# Patient Record
Sex: MALE | Race: WHITE | NOT HISPANIC OR LATINO | URBAN - METROPOLITAN AREA
[De-identification: names, ages, dates, MRNs, and addresses within clinical notes are randomized per-mention and may not be internally consistent; named-entity substitution may affect disease eponyms.]

---

## 2021-03-10 ENCOUNTER — OFFICE VISIT (OUTPATIENT)
Dept: URBAN - METROPOLITAN AREA CLINIC 98 | Facility: CLINIC | Age: 66
End: 2021-03-10
Payer: MEDICARE

## 2021-03-10 DIAGNOSIS — K51.80 CHRONIC PANCOLONIC ULCERATIVE COLITIS: ICD-10-CM

## 2021-03-10 DIAGNOSIS — K51.90 ULCERATIVE COLITIS: ICD-10-CM

## 2021-03-10 PROCEDURE — 99215 OFFICE O/P EST HI 40 MIN: CPT | Performed by: INTERNAL MEDICINE

## 2021-03-10 RX ORDER — ROSUVASTATIN CALCIUM 10 MG
TAKE 1 TABLET (10 MG) BY ORAL ROUTE ONCE DAILY TABLET ORAL 1
Qty: 0 | Refills: 0 | Status: ACTIVE | COMMUNITY
Start: 1900-01-01

## 2021-03-10 RX ORDER — DIAZEPAM 5 MG
TABLET ORAL
Qty: 0 | Refills: 0 | Status: ACTIVE | COMMUNITY
Start: 1900-01-01

## 2021-03-10 RX ORDER — FLUTICASONE PROPIONATE 50 UG/1
SPRAY, METERED NASAL
Qty: 0 | Refills: 0 | Status: ACTIVE | COMMUNITY
Start: 1900-01-01

## 2021-03-10 RX ORDER — GLIMEPIRIDE 2 MG/1
TAKE 1 TABLET (2 MG) BY ORAL ROUTE ONCE DAILY TABLET ORAL 1
Qty: 0 | Refills: 0 | Status: ACTIVE | COMMUNITY
Start: 1900-01-01

## 2021-03-10 RX ORDER — CARVEDILOL 6.25 MG/1
TABLET, FILM COATED ORAL
Qty: 0 | Refills: 0 | Status: ACTIVE | COMMUNITY
Start: 1900-01-01

## 2021-03-10 RX ORDER — VALSARTAN 320 MG/1
TAKE 1 TABLET (320 MG) BY ORAL ROUTE ONCE DAILY TABLET ORAL 1
Qty: 0 | Refills: 0 | Status: ACTIVE | COMMUNITY
Start: 1900-01-01

## 2021-03-10 RX ORDER — ALBUTEROL SULFATE 90 UG/1
AEROSOL, METERED RESPIRATORY (INHALATION)
Qty: 0 | Refills: 0 | Status: ACTIVE | COMMUNITY
Start: 1900-01-01

## 2021-03-10 RX ORDER — ZOLPIDEM TARTRATE 10 MG/1
TAKE 1 TABLET (10 MG) BY ORAL ROUTE ONCE DAILY AT BEDTIME TABLET, FILM COATED ORAL 1
Qty: 0 | Refills: 0 | Status: ACTIVE | COMMUNITY
Start: 1900-01-01

## 2021-03-10 RX ORDER — TERAZOSIN 2 MG/1
CAPSULE ORAL
Qty: 0 | Refills: 0 | Status: ACTIVE | COMMUNITY
Start: 1900-01-01

## 2021-03-10 RX ORDER — PHENAZOPYRIDINE HYDROCHLORIDE 100 MG/1
TAKE 1 CAPSULE (60 MG) BY ORAL ROUTE ONCE DAILY TABLET, COATED ORAL 1
Qty: 0 | Refills: 0 | Status: ACTIVE | COMMUNITY
Start: 1900-01-01

## 2021-03-10 RX ORDER — SACUBITRIL AND VALSARTAN 49; 51 MG/1; MG/1
TABLET, FILM COATED ORAL
Qty: 0 | Refills: 0 | Status: ACTIVE | COMMUNITY
Start: 1900-01-01

## 2021-03-10 RX ORDER — RIVAROXABAN 20 MG/1
TAKE 1 TABLET (20 MG) BY ORAL ROUTE ONCE DAILY WITH THE EVENING MEAL TABLET, FILM COATED ORAL 1
Qty: 0 | Refills: 0 | Status: ACTIVE | COMMUNITY
Start: 1900-01-01

## 2021-03-10 RX ORDER — ASPIRIN 325 MG/1
TABLET ORAL
Qty: 0 | Refills: 0 | Status: ACTIVE | COMMUNITY
Start: 1900-01-01

## 2021-03-10 RX ORDER — INSULIN GLARGINE 100 [IU]/ML
INJECTION, SOLUTION SUBCUTANEOUS
Qty: 0 | Refills: 0 | Status: ACTIVE | COMMUNITY
Start: 1900-01-01

## 2021-03-10 RX ORDER — DICLOFENAC EPOLAMINE 0.01 G/1
APPLY 1 PATCH (180 MG) TO MOST PAINFUL AREA BY TRANSDERMAL ROUTE 2 TIMES PER DAY SYSTEM TOPICAL 2
Qty: 0 | Refills: 0 | Status: ACTIVE | COMMUNITY
Start: 1900-01-01

## 2021-03-10 RX ORDER — ALPRAZOLAM 0.5 MG/1
TABLET ORAL
Qty: 0 | Refills: 0 | Status: ACTIVE | COMMUNITY
Start: 1900-01-01

## 2021-03-10 NOTE — HPI-OTHER HISTORIES
64 yo male with UC last on Humira in 2016 and it was stopped due to suspcion of Heat failures.    EF was 24%.  2016 or so got pacemaker and defibillator.  Everything aok now.  Still sees Dr. Marx Diabetes is better.  UC is good. No pain diarrhea or bleeding.  Dx with UC 12 years ago and was severe.  No recent colonoscopy. UC syx all good.  Was so sick until he started Humira ---- it saved his life.  Mesalamine made him sick --- allergic to mesalamine.  Still on laudry list of meds. Depression and pain. Neck and low back Quit drinking. BP is good now.  Discussed need for colonoscopy to check for colon cancer and he is at increased risk of colon cancer due to age and UC and he understands that.  He wants one eventually but not in the next months.  Does not want labs.  Will try to get labs and records and meds.  Retired and sufficient resources. Reverse mortgage until he is 72. Payments of $3,000 end at age 72. He was taking 5000 a month from his [employer], won't be taxable.  Will reconcile meds etc.  ===================== 2018  Follow-up UC    History Of Present Illness  This is a scheduled appointment for this patient, a 63 year old /White male, after a previous visit on 07/02/2018, for a follow-up evaluation of Ulcerative colitis.     64 yo male comes in for UC follow up.  Was last on Humira 2 years ago.  At May 11 2016 ov, after discussion with Dr. Marx, decision made to d/c Humira due to unexplained low EF of 24%.  Off Humira for 2 years, his EF is now 55%.  Salbador has wanted to resume Humira but we have now done a re-evaluation and he has mild not moderate or severe syx.  Choice, if needed might be TOFa.  H/o shingles vaccine and pneumonia vaccine.          Past Medical History  Disease Name Date Onset Notes  Colon polyps [facility] 05/11/2016 - 01/27/2016 -   Diabetes k 05/11/2016 - 01/27/2016 -   Hypertension k 05/11/2016 - 01/27/2016 -   Influenza Immunization [facility] 05/11/2016 - 01/27/2016 -   Rectal Bleeding 5/2018 05/17/2018 -   Ulcerative colitis unk 05/11/2016 - 05/11/2016 - 01/27/2016 -       Past Surgical History  Procedure Name Date Notes  Appendectomy unk 05/11/2016 - 01/27/2016 -   Colonoscopy 6/2018 07/02/2018 - 05/17/2018 -   Fracture Surgery unk-Triple fusion 05/11/2016 - 01/27/2016 -   Spinal Surgery unk 05/11/2016 - 01/27/2016 -       Medication List  Name Date Started Instructions  albuterol sulfate inhalation  --   alprazolam 0.5 mg oral tablet  --   Amaryl 2 mg oral tablet  take 1 tablet (2 mg) by oral route once daily  Ambien 10 mg oral tablet  take 1 tablet (10 mg) by oral route once daily at bedtime  amidirone 200 mg tab  --   Owen Aspirin 325 mg oral tablet  --   carvedilol 6.25 mg oral tablet  --   Crestor 10 mg oral tablet  take 1 tablet (10 mg) by oral route once daily  Cymbalta 60 mg oral capsule,delayed release(DR/EC)  take 1 capsule (60 mg) by oral route once daily  Diovan 320 mg oral tablet  take 1 tablet (320 mg) by oral route once daily  Entresto 49-51 mg oral tablet  --   Flector 1.3 % transdermal patch 12 hour  apply 1 patch (180 mg) to most painful area by transdermal route 2 times per day  Flonase Allergy Relief 50 mcg/actuation nasal spray,suspension  --   fluticasone 50 mcg/actuation nasal spray,suspension  --   Humira Pen 40 mg/0.8 mL subcutaneous pen injector kit 06/21/2018 inject 0.8 milliliter by subcutaneous route every 2 weeks  Humira Pen Crohn's-[facility] Start 40 mg/0.8 mL subcutaneous pen injector kit 06/21/2018 4 pens/160mg on Day 1 -- 2 pens/80 mg on Day 15  Januvia oral  --   Lantus 100 unit/mL subcutaneous solution  --   Lexapro 10 mg oral tablet  take 1 tablet (10 mg) by oral route once daily  magnesium 200 mg oral tablet  --   meloxicam oral  --   metoprolol tartrate oral  --   oxycodone oral  --   ProAir HFA 90 mcg/actuation inhalation HFA aerosol inhaler  --   Symbicort inhalation  --   terazosin 2 mg oral capsule  --   Valium 5 mg oral tablet  --   Wellbutrin 100 mg oral tablet  300 qd  Xarelto 20 mg oral tablet  take 1 tablet (20 mg) by oral route once daily with the evening meal      Allergy List  Allergen Name Date Reaction Notes  No Known Environmental Allergies --  --  --   No Known Food Allergies --  --  --   other --  --  05/11/2016 - MESALAMINE      Family Medical History  Disease Name Relative/Age Notes  Family history of heart disease/coronary artery disease Father/ Mother/ Sister/  5/11/2016      Social History  Finding Status Start/Stop Quantity Notes  Alcohol Former --/-- --  07/02/2018 - 05/17/2018 - 11/13/2017 - 05/11/2016 - 01/27/2016 -   Denies illicit substance abuse Never --/-- --  07/02/2018 -   Denies substance abuse Never --/-- --  07/02/2018 -   Tobacco Former --/-- cigars 07/02/2018 - 05/17/2018 - 11/13/2017 - 05/11/2016 - 01/27/2016 -       Review of Systems  ConstitutionalDenies : body aches, chills, fatigue, fever, loss of appetite, malaise, night sweats, weight gain, weight loss  EyesDenies : blurred vision, visual changes  HENTDenies : Ear Pain/Ringing, hearing loss, Mouth Ulcers/Sores, nose bleeds, problems with gums/teeth, trouble swallowing  CardiovascularDenies : chest pain, leaky heart valves, heart murmur, heart racing/skipping, high blood pressure, palpitations  RespiratoryDenies : chronic cough, shortness of breath, wheezing or asthma symptoms  GastrointestinalDenies : Abdominal Pain/discomfort, Anal/Rectal Pain or Itching, Anal Spasm, Black Stool, Bloating/belching/gaseouness, Change of Bowel Habit, Constipation, Diarrhea/Loose Stool, Difficulty in Swallowing, Heartburn/esophageal reflux, Hemorrhoids, Indigestion, Mucus in Stool, Nausea/vomiting, Rectal Bleeding, Unintentional Weight Loss  GenitourinaryDenies : Blood in Urine, Burning/pain with Urination, frequency, Recent/frequent UTI, Kidney Stones  IntegumentDenies : itching/dry skin, jaundice, rashes, bumps or sores  NeurologicDenies : headaches, dizziness/vertigo, head trauma/injury, recent numbness/weakness, seizures  MusculoskeletalDenies : back pain, decreased range of motion, joint pain/arthritis, Problems Walking/calf or leg pain  EndocrineDenies : bruise easily, excessive thirst, heat/cold intolerance, history of high or low blood sugar  PsychiatricDenies : anxiety, changes in sleep pattern, depression, loss of memory  Heme-LymphDenies : Bleeding Problems, Enlarged Nodes/Swolen Glands, excessive bruising, history of anemia  Allergic-ImmunologicDenies : seasonal allergies    Vitals  Date Time BP Position Site L\R Cuff Size HR RR TEMP (F) WT  HT  BMI kg/m2 BSA m2 O2 Sat HC     07/02/2018 11:45 /66 Sitting    80 - R  98.3 218lbs 2oz 6'  2" 28.01 2.27        Physical Examination  ConstitutionalAppearance : Well nourished, well developed patient in no distress. Ambulating without difficulty.  Ability to Communicate : Normal communication ability  EyesConjunctivae and Eyelids : Conjunctivae and eyelids appear normal  Sclerae : White without injection  HENTHead :  Inspection : Normocephalic and atraumatic  Face :  Inspection : Face within normal limits  Ears :  External Ears : External ears within normal limits  Nose/Nasopharynx :  External Nose : External nose normal appearance, nares patent, no nasal discharge  Mouth and Throat :  General : Oral cavity appearance normal, breath odor normal  Lips : Appearance normal  NeckInspection and Palpation : Normal appearance without tenderness on palpation or deformities, trachea midline  Thyroid : Normal size without tenderness, nodules or masses  Jugular Veins : no JVD  ChestInspection of Chest : No lesions, deformities or traumatic injuries present. No significant scars are present.  Respiratory Effort : Breathing is unlabored without accessory muscle use  Palpation of Chest : Chest wall non-tender with no masses present. Tactile fremitus is normal  Auscultation : Normal breath sounds  CardiovascularHeart :  Auscultation : Heart rate is regular with normal rhythm. No murmurs are heard. No edema.  GastrointestinalAbdominal Exam : Abdomen soft without rigidity or guarding, abdomen non-tender to palpation, normal bowel sounds, no masses present, non-distended  Liver and Spleen : No hepatomegaly present. Liver is non-tender to palpation and spleen is not palpable.  LymphaticNeck : No lymphadenopathy present  Axillae : No lymphadenopathy present  Groin : No lymphadenopathy present  MusculoskeletalGait and Station : Normal Gait, normal station  Neck/Spine/Pelvis : No tenderness of deformities present.  SkinGeneral Inspection : No rashes, lesions or areas of discoloration present. Skin turgor is normal.  General Palpation : No abnormalities, masses or tenderness on palpation.  Neurologic and PsychiatricOrientation : Oriented to person, place and time  Sensation : Normal sensation  Memory : Short and long term memory intact.  Mood and Affect : Normal mood with an appropriate affect      Assessment  Ulcerative colitis     556.6  Ulcerative colitis     556.9/K51.90    Plan  OrdersPatient not identified as an unhealthy alcohol user when screene () - - 07/02/2018  Influenza immunization administered or previously received () - - 07/02/2018  BMI screening above normal () - - 07/02/2018  Current tobacco non-user (CAD, cap, COPD, PV) (dm) (IBD) (1036F, ) - - 07/02/2018  Colorectal cancer screening results documented and reviewed (PV) (3017F) - - 07/02/2018  Documentation of current medications. () - - 07/02/2018  CMP (comprehensive metabolic panel) (08003) - - 07/02/2018  Sed rate (02611) - - 07/02/2018  C-reactive protein (59816) - - 07/02/2018  Ferritin assay (80528) - - 07/02/2018  Iron + iron binding capacity panel ser/plas (53182, 78191) - - 07/02/2018  Vitamin B12 and folate measurement (72589, 63084) - - 07/02/2018  CBC with diff (21607) - - 07/02/2018  25-OH-Vitamin D (93824) - - 07/02/2018  InstructionsDISCUSSION:  I have documented a list of current medications and reviewed it with the patient.  I encouraged the patient to diet and exercise.  DispositionReturn To Clinic in 6 Months    For consideration by Dr. Marx-- would use Tofa and only use Humira if needed.  Discuss with Dr. Devries re: Tubular adenoma in descending colon polyp bx.  Discussed with Salbador.  F/U colon 6-12 months with Dr. Devries.  cc:  Brian Devries MD cc:  Ashok San MD         Electronically Signed by: Vladimir Fabian MD -Author on July 2, 2018 12:21:26 PM

## 2021-03-23 ENCOUNTER — TELEPHONE ENCOUNTER (OUTPATIENT)
Dept: URBAN - METROPOLITAN AREA CLINIC 98 | Facility: CLINIC | Age: 66
End: 2021-03-23

## 2021-03-23 RX ORDER — ONDANSETRON HYDROCHLORIDE 8 MG/1
1 CAPSULE TABLET, FILM COATED ORAL TWICE DAILY
Qty: 180 CAPSULES | Refills: 0 | OUTPATIENT
Start: 2021-03-23 | End: 2021-06-21

## 2021-03-23 RX ORDER — METHYLPREDNISOLONE 4 MG/1
AS DIRECTED TABLET ORAL ONCE DAILY
Qty: 21 TABLETS | Refills: 1 | OUTPATIENT
Start: 2021-03-23 | End: 2021-04-04

## 2021-05-13 ENCOUNTER — ERX REFILL RESPONSE (OUTPATIENT)
Dept: URBAN - METROPOLITAN AREA CLINIC 98 | Facility: CLINIC | Age: 66
End: 2021-05-13

## 2021-05-13 RX ORDER — ONDANSETRON 4 MG/1
TAKE 1 TAB BY MOUTH TWICE A DAY FOR 90 DAYS TABLET, ORALLY DISINTEGRATING ORAL
Qty: 180 | Refills: 0

## 2021-10-18 ENCOUNTER — TELEPHONE ENCOUNTER (OUTPATIENT)
Dept: URBAN - METROPOLITAN AREA CLINIC 98 | Facility: CLINIC | Age: 66
End: 2021-10-18

## 2021-10-18 RX ORDER — METHYLPREDNISOLONE 4 MG/1
AS DIRECTED TABLET ORAL ONCE A DAY
Qty: 21 TABLETS | Refills: 1 | OUTPATIENT
Start: 2021-10-18 | End: 2021-10-30

## 2021-10-21 ENCOUNTER — TELEPHONE ENCOUNTER (OUTPATIENT)
Dept: URBAN - METROPOLITAN AREA CLINIC 98 | Facility: CLINIC | Age: 66
End: 2021-10-21

## 2021-10-21 ENCOUNTER — OFFICE VISIT (OUTPATIENT)
Dept: URBAN - METROPOLITAN AREA CLINIC 98 | Facility: CLINIC | Age: 66
End: 2021-10-21
Payer: MEDICARE

## 2021-10-21 DIAGNOSIS — R10.84 GENERALIZED ABDOMINAL PAIN: ICD-10-CM

## 2021-10-21 DIAGNOSIS — K51.90 ULCERATIVE COLITIS: ICD-10-CM

## 2021-10-21 DIAGNOSIS — R63.4 WEIGHT LOSS: ICD-10-CM

## 2021-10-21 PROCEDURE — 74177 CT ABD & PELVIS W/CONTRAST: CPT | Performed by: INTERNAL MEDICINE

## 2021-10-21 PROCEDURE — 99215 OFFICE O/P EST HI 40 MIN: CPT | Performed by: INTERNAL MEDICINE

## 2021-10-21 RX ORDER — METHYLPREDNISOLONE 4 MG/1
AS DIRECTED TABLET ORAL ONCE A DAY
Qty: 21 TABLETS | Refills: 1 | Status: ACTIVE | COMMUNITY
Start: 2021-10-18 | End: 2021-10-30

## 2021-10-21 RX ORDER — ONDANSETRON 4 MG/1
TAKE 1 TAB BY MOUTH TWICE A DAY FOR 90 DAYS TABLET, ORALLY DISINTEGRATING ORAL
Qty: 180 | Refills: 0 | Status: ACTIVE | COMMUNITY

## 2021-10-21 RX ORDER — FLUTICASONE PROPIONATE 50 UG/1
SPRAY, METERED NASAL
Qty: 0 | Refills: 0 | Status: ACTIVE | COMMUNITY
Start: 1900-01-01

## 2021-10-21 RX ORDER — SACUBITRIL AND VALSARTAN 49; 51 MG/1; MG/1
TABLET, FILM COATED ORAL
Qty: 0 | Refills: 0 | Status: ACTIVE | COMMUNITY
Start: 1900-01-01

## 2021-10-21 RX ORDER — VALSARTAN 320 MG/1
TAKE 1 TABLET (320 MG) BY ORAL ROUTE ONCE DAILY TABLET ORAL 1
Qty: 0 | Refills: 0 | Status: ACTIVE | COMMUNITY
Start: 1900-01-01

## 2021-10-21 RX ORDER — ALBUTEROL SULFATE 90 UG/1
AEROSOL, METERED RESPIRATORY (INHALATION)
Qty: 0 | Refills: 0 | Status: ACTIVE | COMMUNITY
Start: 1900-01-01

## 2021-10-21 RX ORDER — DIAZEPAM 5 MG
TABLET ORAL
Qty: 0 | Refills: 0 | Status: ACTIVE | COMMUNITY
Start: 1900-01-01

## 2021-10-21 RX ORDER — INSULIN GLARGINE 100 [IU]/ML
INJECTION, SOLUTION SUBCUTANEOUS
Qty: 0 | Refills: 0 | Status: ACTIVE | COMMUNITY
Start: 1900-01-01

## 2021-10-21 RX ORDER — RIVAROXABAN 20 MG/1
TAKE 1 TABLET (20 MG) BY ORAL ROUTE ONCE DAILY WITH THE EVENING MEAL TABLET, FILM COATED ORAL 1
Qty: 0 | Refills: 0 | Status: ACTIVE | COMMUNITY
Start: 1900-01-01

## 2021-10-21 RX ORDER — ZOLPIDEM TARTRATE 10 MG/1
TAKE 1 TABLET (10 MG) BY ORAL ROUTE ONCE DAILY AT BEDTIME TABLET, FILM COATED ORAL 1
Qty: 0 | Refills: 0 | Status: ACTIVE | COMMUNITY
Start: 1900-01-01

## 2021-10-21 RX ORDER — ALPRAZOLAM 0.5 MG/1
TABLET ORAL
Qty: 0 | Refills: 0 | Status: ACTIVE | COMMUNITY
Start: 1900-01-01

## 2021-10-21 RX ORDER — GLIMEPIRIDE 2 MG/1
TAKE 1 TABLET (2 MG) BY ORAL ROUTE ONCE DAILY TABLET ORAL 1
Qty: 0 | Refills: 0 | Status: ACTIVE | COMMUNITY
Start: 1900-01-01

## 2021-10-21 RX ORDER — CARVEDILOL 6.25 MG/1
TABLET, FILM COATED ORAL
Qty: 0 | Refills: 0 | Status: ACTIVE | COMMUNITY
Start: 1900-01-01

## 2021-10-21 RX ORDER — PHENAZOPYRIDINE HYDROCHLORIDE 100 MG/1
TAKE 1 CAPSULE (60 MG) BY ORAL ROUTE ONCE DAILY TABLET, COATED ORAL 1
Qty: 0 | Refills: 0 | Status: ACTIVE | COMMUNITY
Start: 1900-01-01

## 2021-10-21 RX ORDER — DICLOFENAC EPOLAMINE 0.01 G/1
APPLY 1 PATCH (180 MG) TO MOST PAINFUL AREA BY TRANSDERMAL ROUTE 2 TIMES PER DAY SYSTEM TOPICAL 2
Qty: 0 | Refills: 0 | Status: ACTIVE | COMMUNITY
Start: 1900-01-01

## 2021-10-21 RX ORDER — HYOSCYAMINE SULFATE 0.12 MG/1
1 TABLET UNDER THE TONGUE AND ALLOW TO DISSOLVE  AS NEEDED TABLET, ORALLY DISINTEGRATING ORAL
Qty: 120 TABLETS | Refills: 5 | OUTPATIENT
Start: 2021-10-21 | End: 2022-04-19

## 2021-10-21 RX ORDER — ROSUVASTATIN CALCIUM 10 MG
TAKE 1 TABLET (10 MG) BY ORAL ROUTE ONCE DAILY TABLET ORAL 1
Qty: 0 | Refills: 0 | Status: ACTIVE | COMMUNITY
Start: 1900-01-01

## 2021-10-21 RX ORDER — TERAZOSIN 2 MG/1
CAPSULE ORAL
Qty: 0 | Refills: 0 | Status: ACTIVE | COMMUNITY
Start: 1900-01-01

## 2021-10-21 RX ORDER — ASPIRIN 325 MG/1
TABLET ORAL
Qty: 0 | Refills: 0 | Status: ACTIVE | COMMUNITY
Start: 1900-01-01

## 2021-10-21 NOTE — HPI-TODAY'S VISIT:
67 yo male with UC dx 2007.    8 month f/u.  Last on Humira 2016.  Allergic to mesalamine.  Severe UC at dx.  Severe CAD. Pacemaker and debrillator.  Dr. Sung cardiologist.  Has been having cramps the past few weeks.  Hurt some this morning.  Has run out of levsin .125 q 4 prn.  It gives him relief.  Pain is below umbilicus.  May last up to a few hours.  Eating less and lost 20 lbs.  No antibiotics over past 6 months.  On day 3 of medrol dose pack.  no fever.  1 stool a day.  Little diarrhea.  No blood in stool today   =========================== 3/10/21  64 yo male with UC last on Humira in 2016 and it was stopped due to suspcion of Heat failures.    EF was 24%.  2016 or so got pacemaker and defibillator.  Everything aok now.  Still sees Dr. Marx Diabetes is better.  UC is good. No pain diarrhea or bleeding.  Dx with UC 12 years ago and was severe.  No recent colonoscopy. UC syx all good.  Was so sick until he started Humira ---- it saved his life.  Mesalamine made him sick --- allergic to mesalamine.  Still on laudry list of meds. Depression and pain. Neck and low back Quit drinking. BP is good now.  Discussed need for colonoscopy to check for colon cancer and he is at increased risk of colon cancer due to age and UC and he understands that.  He wants one eventually but not in the next months.  Does not want labs.  Will try to get labs and records and meds.  Retired and sufficient resources. Reverse mortgage until he is 72. Payments of $3,000 end at age 72. He was taking 5000 a month from his [employer], won't be taxable.  Will reconcile meds etc.  ===================== 2018  Follow-up UC    History Of Present Illness  This is a scheduled appointment for this patient, a 63 year old /White male, after a previous visit on 07/02/2018, for a follow-up evaluation of Ulcerative colitis.     62 yo male comes in for UC follow up.  Was last on Humira 2 years ago.  At May 11 2016 ov, after discussion with Dr. Marx, decision made to d/c Humira due to unexplained low EF of 24%.  Off Humira for 2 years, his EF is now 55%.  Salbador has wanted to resume Humira but we have now done a re-evaluation and he has mild not moderate or severe syx.  Choice, if needed might be TOFa.  H/o shingles vaccine and pneumonia vaccine.          Past Medical History  Disease Name Date Onset Notes  Colon polyps [facility] 05/11/2016 - 01/27/2016 -   Diabetes [facility] 05/11/2016 - 01/27/2016 -   Hypertension [facility] 05/11/2016 - 01/27/2016 -   Influenza Immunization [facility] 05/11/2016 - 01/27/2016 -   Rectal Bleeding 5/2018 05/17/2018 -   Ulcerative colitis [facility] 05/11/2016 - 05/11/2016 - 01/27/2016 -       Past Surgical History  Procedure Name Date Notes  Appendectomy [facility] 05/11/2016 - 01/27/2016 -   Colonoscopy 6/2018 07/02/2018 - 05/17/2018 -   Fracture Surgery [facility]-Triple fusion 05/11/2016 - 01/27/2016 -   Spinal Surgery [facility] 05/11/2016 - 01/27/2016 -       Medication List  Name Date Started Instructions  albuterol sulfate inhalation  --   alprazolam 0.5 mg oral tablet  --   Amaryl 2 mg oral tablet  take 1 tablet (2 mg) by oral route once daily  Ambien 10 mg oral tablet  take 1 tablet (10 mg) by oral route once daily at bedtime  amidirone 200 mg tab  --   Owen Aspirin 325 mg oral tablet  --   carvedilol 6.25 mg oral tablet  --   Crestor 10 mg oral tablet  take 1 tablet (10 mg) by oral route once daily  Cymbalta 60 mg oral capsule,delayed release(DR/EC)  take 1 capsule (60 mg) by oral route once daily  Diovan 320 mg oral tablet  take 1 tablet (320 mg) by oral route once daily  Entresto 49-51 mg oral tablet  --   Flector 1.3 % transdermal patch 12 hour  apply 1 patch (180 mg) to most painful area by transdermal route 2 times per day  Flonase Allergy Relief 50 mcg/actuation nasal spray,suspension  --   fluticasone 50 mcg/actuation nasal spray,suspension  --   Humira Pen 40 mg/0.8 mL subcutaneous pen injector kit 06/21/2018 inject 0.8 milliliter by subcutaneous route every 2 weeks  Humira Pen Crohn's-[facility] Start 40 mg/0.8 mL subcutaneous pen injector kit 06/21/2018 4 pens/160mg on Day 1 -- 2 pens/80 mg on Day 15  Januvia oral  --   Lantus 100 unit/mL subcutaneous solution  --   Lexapro 10 mg oral tablet  take 1 tablet (10 mg) by oral route once daily  magnesium 200 mg oral tablet  --   meloxicam oral  --   metoprolol tartrate oral  --   oxycodone oral  --   ProAir HFA 90 mcg/actuation inhalation HFA aerosol inhaler  --   Symbicort inhalation  --   terazosin 2 mg oral capsule  --   Valium 5 mg oral tablet  --   Wellbutrin 100 mg oral tablet  300 qd  Xarelto 20 mg oral tablet  take 1 tablet (20 mg) by oral route once daily with the evening meal      Allergy List  Allergen Name Date Reaction Notes  No Known Environmental Allergies --  --  --   No Known Food Allergies --  --  --   other --  --  05/11/2016 - MESALAMINE      Family Medical History  Disease Name Relative/Age Notes  Family history of heart disease/coronary artery disease Father/ Mother/ Sister/  5/11/2016      Social History  Finding Status Start/Stop Quantity Notes  Alcohol Former --/-- --  07/02/2018 - 05/17/2018 - 11/13/2017 - 05/11/2016 - 01/27/2016 -   Denies illicit substance abuse Never --/-- --  07/02/2018 -   Denies substance abuse Never --/-- --  07/02/2018 -   Tobacco Former --/-- cigars 07/02/2018 - 05/17/2018 - 11/13/2017 - 05/11/2016 - 01/27/2016 -       Review of Systems  ConstitutionalDenies : body aches, chills, fatigue, fever, loss of appetite, malaise, night sweats, weight gain, weight loss  EyesDenies : blurred vision, visual changes  HENTDenies : Ear Pain/Ringing, hearing loss, Mouth Ulcers/Sores, nose bleeds, problems with gums/teeth, trouble swallowing  CardiovascularDenies : chest pain, leaky heart valves, heart murmur, heart racing/skipping, high blood pressure, palpitations  RespiratoryDenies : chronic cough, shortness of breath, wheezing or asthma symptoms  GastrointestinalDenies : Abdominal Pain/discomfort, Anal/Rectal Pain or Itching, Anal Spasm, Black Stool, Bloating/belching/gaseouness, Change of Bowel Habit, Constipation, Diarrhea/Loose Stool, Difficulty in Swallowing, Heartburn/esophageal reflux, Hemorrhoids, Indigestion, Mucus in Stool, Nausea/vomiting, Rectal Bleeding, Unintentional Weight Loss  GenitourinaryDenies : Blood in Urine, Burning/pain with Urination, frequency, Recent/frequent UTI, Kidney Stones  IntegumentDenies : itching/dry skin, jaundice, rashes, bumps or sores  NeurologicDenies : headaches, dizziness/vertigo, head trauma/injury, recent numbness/weakness, seizures  MusculoskeletalDenies : back pain, decreased range of motion, joint pain/arthritis, Problems Walking/calf or leg pain  EndocrineDenies : bruise easily, excessive thirst, heat/cold intolerance, history of high or low blood sugar  PsychiatricDenies : anxiety, changes in sleep pattern, depression, loss of memory  Heme-LymphDenies : Bleeding Problems, Enlarged Nodes/Swolen Glands, excessive bruising, history of anemia  Allergic-ImmunologicDenies : seasonal allergies    Vitals  Date Time BP Position Site L\R Cuff Size HR RR TEMP (F) WT  HT  BMI kg/m2 BSA m2 O2 Sat      07/02/2018 11:45 /66 Sitting    80 - R  98.3 218lbs 2oz 6'  2" 28.01 2.27        Physical Examination  ConstitutionalAppearance : Well nourished, well developed patient in no distress. Ambulating without difficulty.  Ability to Communicate : Normal communication ability  EyesConjunctivae and Eyelids : Conjunctivae and eyelids appear normal  Sclerae : White without injection  HENTHead :  Inspection : Normocephalic and atraumatic  Face :  Inspection : Face within normal limits  Ears :  External Ears : External ears within normal limits  Nose/Nasopharynx :  External Nose : External nose normal appearance, nares patent, no nasal discharge  Mouth and Throat :  General : Oral cavity appearance normal, breath odor normal  Lips : Appearance normal  NeckInspection and Palpation : Normal appearance without tenderness on palpation or deformities, trachea midline  Thyroid : Normal size without tenderness, nodules or masses  Jugular Veins : no JVD  ChestInspection of Chest : No lesions, deformities or traumatic injuries present. No significant scars are present.  Respiratory Effort : Breathing is unlabored without accessory muscle use  Palpation of Chest : Chest wall non-tender with no masses present. Tactile fremitus is normal  Auscultation : Normal breath sounds  CardiovascularHeart :  Auscultation : Heart rate is regular with normal rhythm. No murmurs are heard. No edema.  GastrointestinalAbdominal Exam : Abdomen soft without rigidity or guarding, abdomen non-tender to palpation, normal bowel sounds, no masses present, non-distended  Liver and Spleen : No hepatomegaly present. Liver is non-tender to palpation and spleen is not palpable.  LymphaticNeck : No lymphadenopathy present  Axillae : No lymphadenopathy present  Groin : No lymphadenopathy present  MusculoskeletalGait and Station : Normal Gait, normal station  Neck/Spine/Pelvis : No tenderness of deformities present.  SkinGeneral Inspection : No rashes, lesions or areas of discoloration present. Skin turgor is normal.  General Palpation : No abnormalities, masses or tenderness on palpation.  Neurologic and PsychiatricOrientation : Oriented to person, place and time  Sensation : Normal sensation  Memory : Short and long term memory intact.  Mood and Affect : Normal mood with an appropriate affect      Assessment  Ulcerative colitis     556.6  Ulcerative colitis     556.9/K51.90    Plan  OrdersPatient not identified as an unhealthy alcohol user when screene () - - 07/02/2018  Influenza immunization administered or previously received () - - 07/02/2018  BMI screening above normal () - - 07/02/2018  Current tobacco non-user (CAD, cap, COPD, PV) (dm) (IBD) (1036F, ) - - 07/02/2018  Colorectal cancer screening results documented and reviewed (PV) (3017F) - - 07/02/2018  Documentation of current medications. () - - 07/02/2018  CMP (comprehensive metabolic panel) (99831) - - 07/02/2018  Sed rate (17387) - - 07/02/2018  C-reactive protein (66701) - - 07/02/2018  Ferritin assay (22020) - - 07/02/2018  Iron + iron binding capacity panel ser/plas (99165, 18014) - - 07/02/2018  Vitamin B12 and folate measurement (93484, 49846) - - 07/02/2018  CBC with diff (91478) - - 07/02/2018  25-OH-Vitamin D (64115) - - 07/02/2018  InstructionsDISCUSSION:  I have documented a list of current medications and reviewed it with the patient.  I encouraged the patient to diet and exercise.  DispositionReturn To Clinic in 6 Months    For consideration by Dr. Marx-- would use Tofa and only use Humira if needed.  Discuss with Dr. Devries re: Tubular adenoma in descending colon polyp bx.  Discussed with Salbador.  F/U colon 6-12 months with Dr. Devries.  cc:  Brian Devries MD cc:  Ashok San MD         Electronically Signed by: Vladimir Fabian MD -Author on July 2, 2018 12:21:26 PM

## 2021-10-22 ENCOUNTER — LAB OUTSIDE AN ENCOUNTER (OUTPATIENT)
Dept: URBAN - METROPOLITAN AREA CLINIC 98 | Facility: CLINIC | Age: 66
End: 2021-10-22

## 2021-10-22 ENCOUNTER — TELEPHONE ENCOUNTER (OUTPATIENT)
Dept: URBAN - METROPOLITAN AREA CLINIC 98 | Facility: CLINIC | Age: 66
End: 2021-10-22

## 2021-10-22 LAB
CREATININE POC: 1
PERFORMING LAB: (no result)

## 2021-10-23 LAB
A/G RATIO: 2.1
ALBUMIN: 4.9
ALKALINE PHOSPHATASE: 97
ALT (SGPT): 19
AMYLASE: 28
AST (SGOT): 18
BASO (ABSOLUTE): 0
BASOS: 0
BILIRUBIN, TOTAL: 1
BUN/CREATININE RATIO: 18
BUN: 20
C-REACTIVE PROTEIN, QUANT: <1
CALCIUM: 9.9
CARBON DIOXIDE, TOTAL: 28
CHLORIDE: 94
CREATININE: 1.14
EGFR IF AFRICN AM: 77
EGFR IF NONAFRICN AM: 66
EOS (ABSOLUTE): 0
EOS: 0
FERRITIN, SERUM: 199
FOLATE (FOLIC ACID), SERUM: 16.7
GLOBULIN, TOTAL: 2.3
GLUCOSE: 200
HEMATOCRIT: 46.1
HEMATOLOGY COMMENTS:: (no result)
HEMOGLOBIN: 15.9
IMMATURE CELLS: (no result)
IMMATURE GRANS (ABS): 0.1
IMMATURE GRANULOCYTES: 1
IRON BIND.CAP.(TIBC): 329
IRON SATURATION: 49
IRON: 161
LIPASE: 19
LYMPHS (ABSOLUTE): 1.7
LYMPHS: 10
MCH: 30.9
MCHC: 34.5
MCV: 90
MONOCYTES(ABSOLUTE): 1.3
MONOCYTES: 7
NEUTROPHILS (ABSOLUTE): 13.8
NEUTROPHILS: 82
NRBC: (no result)
PLATELETS: 297
POTASSIUM: 5.1
PROTEIN, TOTAL: 7.2
RBC: 5.14
RDW: 12.5
SEDIMENTATION RATE-WESTERGREN: 2
SODIUM: 135
UIBC: 168
VITAMIN B12: 1539
VITAMIN D, 25-HYDROXY: 27.4
WBC: 16.9

## 2021-10-25 ENCOUNTER — LAB OUTSIDE AN ENCOUNTER (OUTPATIENT)
Dept: URBAN - METROPOLITAN AREA CLINIC 98 | Facility: CLINIC | Age: 66
End: 2021-10-25

## 2021-10-25 ENCOUNTER — TELEPHONE ENCOUNTER (OUTPATIENT)
Dept: URBAN - METROPOLITAN AREA CLINIC 98 | Facility: CLINIC | Age: 66
End: 2021-10-25

## 2022-01-04 ENCOUNTER — TELEPHONE ENCOUNTER (OUTPATIENT)
Dept: URBAN - METROPOLITAN AREA CLINIC 98 | Facility: CLINIC | Age: 67
End: 2022-01-04

## 2022-01-04 ENCOUNTER — OFFICE VISIT (OUTPATIENT)
Dept: URBAN - METROPOLITAN AREA SURGERY CENTER 18 | Facility: SURGERY CENTER | Age: 67
End: 2022-01-04

## 2022-01-04 RX ORDER — FLUTICASONE PROPIONATE 50 UG/1
SPRAY, METERED NASAL
Qty: 0 | Refills: 0 | Status: ACTIVE | COMMUNITY
Start: 1900-01-01

## 2022-01-04 RX ORDER — HYOSCYAMINE SULFATE 0.125 MG
1 TABLET ON THE TONGUE AND ALLOW TO DISSOLVE  AS NEEDED TABLET,DISINTEGRATING ORAL
Qty: 120 TABLETS | Refills: 5 | OUTPATIENT
Start: 2022-01-04 | End: 2022-07-03

## 2022-01-04 RX ORDER — ASPIRIN 325 MG/1
TABLET ORAL
Qty: 0 | Refills: 0 | Status: ACTIVE | COMMUNITY
Start: 1900-01-01

## 2022-01-04 RX ORDER — GLIMEPIRIDE 2 MG/1
TAKE 1 TABLET (2 MG) BY ORAL ROUTE ONCE DAILY TABLET ORAL 1
Qty: 0 | Refills: 0 | Status: ACTIVE | COMMUNITY
Start: 1900-01-01

## 2022-01-04 RX ORDER — ONDANSETRON 4 MG/1
TAKE 1 TAB BY MOUTH TWICE A DAY FOR 90 DAYS TABLET, ORALLY DISINTEGRATING ORAL
Qty: 180 | Refills: 0 | Status: ACTIVE | COMMUNITY

## 2022-01-04 RX ORDER — VALSARTAN 320 MG/1
TAKE 1 TABLET (320 MG) BY ORAL ROUTE ONCE DAILY TABLET ORAL 1
Qty: 0 | Refills: 0 | Status: ACTIVE | COMMUNITY
Start: 1900-01-01

## 2022-01-04 RX ORDER — DIAZEPAM 5 MG
TABLET ORAL
Qty: 0 | Refills: 0 | Status: ACTIVE | COMMUNITY
Start: 1900-01-01

## 2022-01-04 RX ORDER — PHENAZOPYRIDINE HYDROCHLORIDE 100 MG/1
TAKE 1 CAPSULE (60 MG) BY ORAL ROUTE ONCE DAILY TABLET, COATED ORAL 1
Qty: 0 | Refills: 0 | Status: ACTIVE | COMMUNITY
Start: 1900-01-01

## 2022-01-04 RX ORDER — SACUBITRIL AND VALSARTAN 49; 51 MG/1; MG/1
TABLET, FILM COATED ORAL
Qty: 0 | Refills: 0 | Status: ACTIVE | COMMUNITY
Start: 1900-01-01

## 2022-01-04 RX ORDER — TERAZOSIN 2 MG/1
CAPSULE ORAL
Qty: 0 | Refills: 0 | Status: ACTIVE | COMMUNITY
Start: 1900-01-01

## 2022-01-04 RX ORDER — DICLOFENAC EPOLAMINE 0.01 G/1
APPLY 1 PATCH (180 MG) TO MOST PAINFUL AREA BY TRANSDERMAL ROUTE 2 TIMES PER DAY SYSTEM TOPICAL 2
Qty: 0 | Refills: 0 | Status: ACTIVE | COMMUNITY
Start: 1900-01-01

## 2022-01-04 RX ORDER — ALBUTEROL SULFATE 90 UG/1
AEROSOL, METERED RESPIRATORY (INHALATION)
Qty: 0 | Refills: 0 | Status: ACTIVE | COMMUNITY
Start: 1900-01-01

## 2022-01-04 RX ORDER — ZOLPIDEM TARTRATE 10 MG/1
TAKE 1 TABLET (10 MG) BY ORAL ROUTE ONCE DAILY AT BEDTIME TABLET, FILM COATED ORAL 1
Qty: 0 | Refills: 0 | Status: ACTIVE | COMMUNITY
Start: 1900-01-01

## 2022-01-04 RX ORDER — HYOSCYAMINE SULFATE 0.12 MG/1
1 TABLET UNDER THE TONGUE AND ALLOW TO DISSOLVE  AS NEEDED TABLET, ORALLY DISINTEGRATING ORAL
Qty: 120 TABLETS | Refills: 5 | Status: ACTIVE | COMMUNITY
Start: 2021-10-21 | End: 2022-04-19

## 2022-01-04 RX ORDER — DICYCLOMINE HYDROCHLORIDE 20 MG/1
1 TABLET TABLET ORAL THREE TIMES A DAY
Qty: 90 TABLETS | Refills: 5 | OUTPATIENT
Start: 2022-01-04 | End: 2022-07-03

## 2022-01-04 RX ORDER — CARVEDILOL 6.25 MG/1
TABLET, FILM COATED ORAL
Qty: 0 | Refills: 0 | Status: ACTIVE | COMMUNITY
Start: 1900-01-01

## 2022-01-04 RX ORDER — ALPRAZOLAM 0.5 MG/1
TABLET ORAL
Qty: 0 | Refills: 0 | Status: ACTIVE | COMMUNITY
Start: 1900-01-01

## 2022-01-04 RX ORDER — INSULIN GLARGINE 100 [IU]/ML
INJECTION, SOLUTION SUBCUTANEOUS
Qty: 0 | Refills: 0 | Status: ACTIVE | COMMUNITY
Start: 1900-01-01

## 2022-01-04 RX ORDER — RIVAROXABAN 20 MG/1
TAKE 1 TABLET (20 MG) BY ORAL ROUTE ONCE DAILY WITH THE EVENING MEAL TABLET, FILM COATED ORAL 1
Qty: 0 | Refills: 0 | Status: ACTIVE | COMMUNITY
Start: 1900-01-01

## 2022-01-04 RX ORDER — ROSUVASTATIN CALCIUM 10 MG
TAKE 1 TABLET (10 MG) BY ORAL ROUTE ONCE DAILY TABLET ORAL 1
Qty: 0 | Refills: 0 | Status: ACTIVE | COMMUNITY
Start: 1900-01-01

## 2022-01-05 ENCOUNTER — TELEPHONE ENCOUNTER (OUTPATIENT)
Dept: URBAN - METROPOLITAN AREA CLINIC 98 | Facility: CLINIC | Age: 67
End: 2022-01-05

## 2022-01-06 ENCOUNTER — TELEPHONE ENCOUNTER (OUTPATIENT)
Dept: URBAN - METROPOLITAN AREA SURGERY CENTER 30 | Facility: SURGERY CENTER | Age: 67
End: 2022-01-06

## 2022-01-07 ENCOUNTER — P2P PATIENT RECORD (OUTPATIENT)
Age: 67
End: 2022-01-07

## 2022-01-13 ENCOUNTER — ERX REFILL RESPONSE (OUTPATIENT)
Dept: URBAN - METROPOLITAN AREA CLINIC 98 | Facility: CLINIC | Age: 67
End: 2022-01-13

## 2022-01-13 RX ORDER — HYOSCYAMINE SULFATE 0.12 MG/1
PLACE ONE TABLET UNDER THE TONGUE AND ALLOW TO DISSOLVE AS NEEDED 4 TIMES DAILY TABLET ORAL; SUBLINGUAL
Qty: 120 TABLET | Refills: 6 | OUTPATIENT

## 2022-01-13 RX ORDER — HYOSCYAMINE SULFATE 0.12 MG/1
1 TABLET UNDER THE TONGUE AND ALLOW TO DISSOLVE  AS NEEDED TABLET, ORALLY DISINTEGRATING ORAL
Qty: 120 TABLETS | Refills: 5 | OUTPATIENT

## 2022-01-29 ENCOUNTER — ERX REFILL RESPONSE (OUTPATIENT)
Dept: URBAN - METROPOLITAN AREA CLINIC 98 | Facility: CLINIC | Age: 67
End: 2022-01-29

## 2022-01-29 RX ORDER — DICYCLOMINE HYDROCHLORIDE 20 MG/1
TAKE 1 TABLET BY MOUTH THREE TIMES A DAY FOR 30 DAYS TABLET ORAL
Qty: 90 TABLET | Refills: 6 | OUTPATIENT

## 2022-01-29 RX ORDER — DICYCLOMINE HYDROCHLORIDE 20 MG/1
1 TABLET TABLET ORAL THREE TIMES A DAY
Qty: 90 TABLETS | Refills: 5 | OUTPATIENT

## 2022-02-04 ENCOUNTER — ERX REFILL RESPONSE (OUTPATIENT)
Dept: URBAN - METROPOLITAN AREA CLINIC 98 | Facility: CLINIC | Age: 67
End: 2022-02-04

## 2022-02-04 RX ORDER — HYOSCYAMINE SULFATE 0.125 MG
1 TABLET ON THE TONGUE AND ALLOW TO DISSOLVE  AS NEEDED TABLET,DISINTEGRATING ORAL
Qty: 120 TABLETS | Refills: 5 | OUTPATIENT

## 2022-02-04 RX ORDER — HYOSCYAMINE SULFATE 0.12 MG/1
TAKE 1 TABLET ON THE TONGUE AND ALLOW TO DISSOLVE AS NEEDED ORALLY FOUR TIMES A DAY 30 DAYS TABLET, ORALLY DISINTEGRATING ORAL
Qty: 120 TABLET | Refills: 6 | OUTPATIENT

## 2022-05-13 ENCOUNTER — ERX REFILL RESPONSE (OUTPATIENT)
Dept: URBAN - METROPOLITAN AREA CLINIC 98 | Facility: CLINIC | Age: 67
End: 2022-05-13

## 2022-05-13 RX ORDER — HYOSCYAMINE SULFATE 0.12 MG/1
TAKE 1 TABLET ON THE TONGUE AND ALLOW TO DISSOLVE AS NEEDED FOUR TIMES A DAY FOR 30 DAYS TABLET, ORALLY DISINTEGRATING ORAL
Qty: 360 TABLET | Refills: 2 | OUTPATIENT

## 2022-05-13 RX ORDER — DICYCLOMINE HYDROCHLORIDE 20 MG/1
TAKE 1 TABLET BY MOUTH THREE TIMES A DAY FOR 30 DAYS TABLET ORAL
Qty: 90 TABLET | Refills: 6 | OUTPATIENT

## 2022-05-13 RX ORDER — DICYCLOMINE HYDROCHLORIDE 20 MG/1
TAKE 1 TABLET BY MOUTH THREE TIMES A DAY TABLET ORAL
Qty: 270 TABLET | Refills: 2 | OUTPATIENT

## 2022-08-23 ENCOUNTER — TELEPHONE ENCOUNTER (OUTPATIENT)
Dept: URBAN - METROPOLITAN AREA CLINIC 98 | Facility: CLINIC | Age: 67
End: 2022-08-23

## 2022-08-23 RX ORDER — HYOSCYAMINE SULFATE 0.12 MG/1
PLACE ONE TABLET UNDER THE TONGUE AND ALLOW TO DISSOLVE AS NEEDED 4 TIMES DAILY TABLET ORAL; SUBLINGUAL
Qty: 120 TABLET | Refills: 0
End: 2022-09-22

## 2022-08-24 ENCOUNTER — TELEPHONE ENCOUNTER (OUTPATIENT)
Dept: URBAN - METROPOLITAN AREA CLINIC 92 | Facility: CLINIC | Age: 67
End: 2022-08-24

## 2022-11-13 ENCOUNTER — ERX REFILL RESPONSE (OUTPATIENT)
Dept: URBAN - METROPOLITAN AREA CLINIC 98 | Facility: CLINIC | Age: 67
End: 2022-11-13

## 2022-11-13 RX ORDER — DICYCLOMINE HYDROCHLORIDE 20 MG/1
TAKE 1 TABLET BY MOUTH THREE TIMES A DAY TABLET ORAL
Qty: 270 TABLET | Refills: 2 | OUTPATIENT

## 2022-11-13 RX ORDER — DICYCLOMINE HYDROCHLORIDE 20 MG/1
TAKE 1 TABLET BY MOUTH THREE TIMES A DAY TABLET ORAL
Qty: 270 TABLET | Refills: 1 | OUTPATIENT

## 2023-01-23 ENCOUNTER — DASHBOARD ENCOUNTERS (OUTPATIENT)
Age: 68
End: 2023-01-23

## 2023-01-23 ENCOUNTER — OFFICE VISIT (OUTPATIENT)
Dept: URBAN - METROPOLITAN AREA TELEHEALTH 2 | Facility: TELEHEALTH | Age: 68
End: 2023-01-23
Payer: MEDICARE

## 2023-01-23 VITALS — WEIGHT: 208 LBS | HEIGHT: 74 IN | BODY MASS INDEX: 26.69 KG/M2

## 2023-01-23 DIAGNOSIS — I25.10 CORONARY ARTERY DISEASE INVOLVING NATIVE HEART WITHOUT ANGINA PECTORIS, UNSPECIFIED VESSEL OR LESION TYPE: ICD-10-CM

## 2023-01-23 DIAGNOSIS — I49.9 CARDIAC ARRHYTHMIA, UNSPECIFIED CARDIAC ARRHYTHMIA TYPE: ICD-10-CM

## 2023-01-23 DIAGNOSIS — K51.80 CHRONIC PANCOLONIC ULCERATIVE COLITIS: ICD-10-CM

## 2023-01-23 PROBLEM — 53741008: Status: ACTIVE | Noted: 2023-01-23

## 2023-01-23 PROBLEM — 698247007: Status: ACTIVE | Noted: 2023-01-23

## 2023-01-23 PROCEDURE — 99214 OFFICE O/P EST MOD 30 MIN: CPT | Performed by: INTERNAL MEDICINE

## 2023-01-23 RX ORDER — ONDANSETRON 4 MG/1
TAKE 1 TAB BY MOUTH TWICE A DAY FOR 90 DAYS TABLET, ORALLY DISINTEGRATING ORAL
Qty: 180 | Refills: 0 | Status: ACTIVE | COMMUNITY

## 2023-01-23 RX ORDER — FLUTICASONE PROPIONATE 50 UG/1
SPRAY, METERED NASAL
Qty: 0 | Refills: 0 | Status: ACTIVE | COMMUNITY
Start: 1900-01-01

## 2023-01-23 RX ORDER — GLIMEPIRIDE 2 MG/1
TAKE 1 TABLET (2 MG) BY ORAL ROUTE ONCE DAILY TABLET ORAL 1
Qty: 0 | Refills: 0 | Status: ACTIVE | COMMUNITY
Start: 1900-01-01

## 2023-01-23 RX ORDER — DICYCLOMINE HYDROCHLORIDE 20 MG/1
TAKE 1 TABLET BY MOUTH THREE TIMES A DAY TABLET ORAL
Qty: 270 TABLET | Refills: 1 | Status: ACTIVE | COMMUNITY

## 2023-01-23 RX ORDER — DIAZEPAM 5 MG
TABLET ORAL
Qty: 0 | Refills: 0 | Status: ACTIVE | COMMUNITY
Start: 1900-01-01

## 2023-01-23 RX ORDER — ZOLPIDEM TARTRATE 10 MG/1
TAKE 1 TABLET (10 MG) BY ORAL ROUTE ONCE DAILY AT BEDTIME TABLET, FILM COATED ORAL 1
Qty: 0 | Refills: 0 | Status: ACTIVE | COMMUNITY
Start: 1900-01-01

## 2023-01-23 RX ORDER — VALSARTAN 320 MG/1
TAKE 1 TABLET (320 MG) BY ORAL ROUTE ONCE DAILY TABLET ORAL 1
Qty: 0 | Refills: 0 | Status: ACTIVE | COMMUNITY
Start: 1900-01-01

## 2023-01-23 RX ORDER — RIVAROXABAN 20 MG/1
TAKE 1 TABLET (20 MG) BY ORAL ROUTE ONCE DAILY WITH THE EVENING MEAL TABLET, FILM COATED ORAL 1
Qty: 0 | Refills: 0 | Status: ACTIVE | COMMUNITY
Start: 1900-01-01

## 2023-01-23 RX ORDER — INSULIN GLARGINE 100 [IU]/ML
INJECTION, SOLUTION SUBCUTANEOUS
Qty: 0 | Refills: 0 | Status: ACTIVE | COMMUNITY
Start: 1900-01-01

## 2023-01-23 RX ORDER — ALPRAZOLAM 0.5 MG/1
TABLET ORAL
Qty: 0 | Refills: 0 | Status: ACTIVE | COMMUNITY
Start: 1900-01-01

## 2023-01-23 RX ORDER — ALBUTEROL SULFATE 90 UG/1
AEROSOL, METERED RESPIRATORY (INHALATION)
Qty: 0 | Refills: 0 | Status: ACTIVE | COMMUNITY
Start: 1900-01-01

## 2023-01-23 RX ORDER — ASPIRIN 325 MG/1
TABLET ORAL
Qty: 0 | Refills: 0 | Status: ACTIVE | COMMUNITY
Start: 1900-01-01

## 2023-01-23 RX ORDER — PHENAZOPYRIDINE HYDROCHLORIDE 100 MG/1
TAKE 1 CAPSULE (60 MG) BY ORAL ROUTE ONCE DAILY TABLET, COATED ORAL 1
Qty: 0 | Refills: 0 | Status: ACTIVE | COMMUNITY
Start: 1900-01-01

## 2023-01-23 RX ORDER — ROSUVASTATIN CALCIUM 10 MG
TAKE 1 TABLET (10 MG) BY ORAL ROUTE ONCE DAILY TABLET ORAL 1
Qty: 0 | Refills: 0 | Status: ACTIVE | COMMUNITY
Start: 1900-01-01

## 2023-01-23 RX ORDER — CARVEDILOL 6.25 MG/1
TABLET, FILM COATED ORAL
Qty: 0 | Refills: 0 | Status: ACTIVE | COMMUNITY
Start: 1900-01-01

## 2023-01-23 RX ORDER — DICLOFENAC EPOLAMINE 0.01 G/1
APPLY 1 PATCH (180 MG) TO MOST PAINFUL AREA BY TRANSDERMAL ROUTE 2 TIMES PER DAY SYSTEM TOPICAL 2
Qty: 0 | Refills: 0 | Status: ACTIVE | COMMUNITY
Start: 1900-01-01

## 2023-01-23 RX ORDER — HYOSCYAMINE SULFATE 0.12 MG/1
TAKE 1 TABLET ON THE TONGUE AND ALLOW TO DISSOLVE AS NEEDED FOUR TIMES A DAY FOR 30 DAYS TABLET, ORALLY DISINTEGRATING ORAL
Qty: 360 TABLET | Refills: 2 | Status: ACTIVE | COMMUNITY

## 2023-01-23 RX ORDER — SACUBITRIL AND VALSARTAN 49; 51 MG/1; MG/1
TABLET, FILM COATED ORAL
Qty: 0 | Refills: 0 | Status: ACTIVE | COMMUNITY
Start: 1900-01-01

## 2023-01-23 RX ORDER — TERAZOSIN 2 MG/1
CAPSULE ORAL
Qty: 0 | Refills: 0 | Status: ACTIVE | COMMUNITY
Start: 1900-01-01

## 2023-01-23 NOTE — HPI-TODAY'S VISIT:
66 yo male  with  moderate to severe UC needs Hospital colonoscopy due to cardiac issues. He received notice of his bank account being accessed and had to end the call.  Salbador indicated he was without syx. He has severe CAD, pacemaker and defibrillator. He said he wanted a colonoscopy at [facility]. Will need to reschedule a call because he had to get to his bank issue right away.  ======================= 2021 67 yo male with UC dx 2007.    8 month f/u.  Last on Humira 2016.  Allergic to mesalamine.  Severe UC at dx.  Severe CAD. Pacemaker and debrillator.  Dr. Sung cardiologist.  Has been having cramps the past few weeks.  Hurt some this morning.  Has run out of levsin .125 q 4 prn.  It gives him relief.  Pain is below umbilicus.  May last up to a few hours.  Eating less and lost 20 lbs.  No antibiotics over past 6 months.  On day 3 of medrol dose pack.  no fever.  1 stool a day.  Little diarrhea.  No blood in stool today   =========================== 3/10/21  66 yo male with UC last on Humira in 2016 and it was stopped due to suspcion of Heat failures.    EF was 24%.  2016 or so got pacemaker and defibillator.  Everything aok now.  Still sees Dr. Marx Diabetes is better.  UC is good. No pain diarrhea or bleeding.  Dx with UC 12 years ago and was severe.  No recent colonoscopy. UC syx all good.  Was so sick until he started Humira ---- it saved his life.  Mesalamine made him sick --- allergic to mesalamine.  Still on laudry list of meds. Depression and pain. Neck and low back Quit drinking. BP is good now.  Discussed need for colonoscopy to check for colon cancer and he is at increased risk of colon cancer due to age and UC and he understands that.  He wants one eventually but not in the next months.  Does not want labs.  Will try to get labs and records and meds.  Retired and sufficient resources. Reverse mortgage until he is 72. Payments of $3,000 end at age 72. He was taking 5000 a month from his [employer], won't be taxable.  Will reconcile meds etc.  ===================== 2018  Follow-up UC    History Of Present Illness  This is a scheduled appointment for this patient, a 63 year old /White male, after a previous visit on 07/02/2018, for a follow-up evaluation of Ulcerative colitis.     62 yo male comes in for UC follow up.  Was last on Humira 2 years ago.  At May 11 2016 ov, after discussion with Dr. Marx, decision made to d/c Humira due to unexplained low EF of 24%.  Off Humira for 2 years, his EF is now 55%.  Salbador has wanted to resume Humira but we have now done a re-evaluation and he has mild not moderate or severe syx.  Choice, if needed might be TOFa.  H/o shingles vaccine and pneumonia vaccine.          Past Medical History  Disease Name Date Onset Notes  Colon polyps [facility] 05/11/2016 - 01/27/2016 -   Diabetes [facility] 05/11/2016 - 01/27/2016 -   Hypertension [facility] 05/11/2016 - 01/27/2016 -   Influenza Immunization [facility] 05/11/2016 - 01/27/2016 -   Rectal Bleeding 5/2018 05/17/2018 -   Ulcerative colitis [facility] 05/11/2016 - 05/11/2016 - 01/27/2016 -       Past Surgical History  Procedure Name Date Notes  Appendectomy [facility] 05/11/2016 - 01/27/2016 -   Colonoscopy 6/2018 07/02/2018 - 05/17/2018 -   Fracture Surgery [facility]-Triple fusion 05/11/2016 - 01/27/2016 -   Spinal Surgery [facility] 05/11/2016 - 01/27/2016 -       Medication List  Name Date Started Instructions  albuterol sulfate inhalation  --   alprazolam 0.5 mg oral tablet  --   Amaryl 2 mg oral tablet  take 1 tablet (2 mg) by oral route once daily  Ambien 10 mg oral tablet  take 1 tablet (10 mg) by oral route once daily at bedtime  amidirone 200 mg tab  --   Owen Aspirin 325 mg oral tablet  --   carvedilol 6.25 mg oral tablet  --   Crestor 10 mg oral tablet  take 1 tablet (10 mg) by oral route once daily  Cymbalta 60 mg oral capsule,delayed release(DR/EC)  take 1 capsule (60 mg) by oral route once daily  Diovan 320 mg oral tablet  take 1 tablet (320 mg) by oral route once daily  Entresto 49-51 mg oral tablet  --   Flector 1.3 % transdermal patch 12 hour  apply 1 patch (180 mg) to most painful area by transdermal route 2 times per day  Flonase Allergy Relief 50 mcg/actuation nasal spray,suspension  --   fluticasone 50 mcg/actuation nasal spray,suspension  --   Humira Pen 40 mg/0.8 mL subcutaneous pen injector kit 06/21/2018 inject 0.8 milliliter by subcutaneous route every 2 weeks  Humira Pen Crohn's-[facility] Start 40 mg/0.8 mL subcutaneous pen injector kit 06/21/2018 4 pens/160mg on Day 1 -- 2 pens/80 mg on Day 15  Januvia oral  --   Lantus 100 unit/mL subcutaneous solution  --   Lexapro 10 mg oral tablet  take 1 tablet (10 mg) by oral route once daily  magnesium 200 mg oral tablet  --   meloxicam oral  --   metoprolol tartrate oral  --   oxycodone oral  --   ProAir HFA 90 mcg/actuation inhalation HFA aerosol inhaler  --   Symbicort inhalation  --   terazosin 2 mg oral capsule  --   Valium 5 mg oral tablet  --   Wellbutrin 100 mg oral tablet  300 qd  Xarelto 20 mg oral tablet  take 1 tablet (20 mg) by oral route once daily with the evening meal      Allergy List  Allergen Name Date Reaction Notes  No Known Environmental Allergies --  --  --   No Known Food Allergies --  --  --   other --  --  05/11/2016 - MESALAMINE      Family Medical History  Disease Name Relative/Age Notes  Family history of heart disease/coronary artery disease Father/ Mother/ Sister/  5/11/2016      Social History  Finding Status Start/Stop Quantity Notes  Alcohol Former --/-- --  07/02/2018 - 05/17/2018 - 11/13/2017 - 05/11/2016 - 01/27/2016 -   Denies illicit substance abuse Never --/-- --  07/02/2018 -   Denies substance abuse Never --/-- --  07/02/2018 -   Tobacco Former --/-- cigars 07/02/2018 - 05/17/2018 - 11/13/2017 - 05/11/2016 - 01/27/2016 -       Review of Systems  ConstitutionalDenies : body aches, chills, fatigue, fever, loss of appetite, malaise, night sweats, weight gain, weight loss  EyesDenies : blurred vision, visual changes  HENTDenies : Ear Pain/Ringing, hearing loss, Mouth Ulcers/Sores, nose bleeds, problems with gums/teeth, trouble swallowing  CardiovascularDenies : chest pain, leaky heart valves, heart murmur, heart racing/skipping, high blood pressure, palpitations  RespiratoryDenies : chronic cough, shortness of breath, wheezing or asthma symptoms  GastrointestinalDenies : Abdominal Pain/discomfort, Anal/Rectal Pain or Itching, Anal Spasm, Black Stool, Bloating/belching/gaseouness, Change of Bowel Habit, Constipation, Diarrhea/Loose Stool, Difficulty in Swallowing, Heartburn/esophageal reflux, Hemorrhoids, Indigestion, Mucus in Stool, Nausea/vomiting, Rectal Bleeding, Unintentional Weight Loss  GenitourinaryDenies : Blood in Urine, Burning/pain with Urination, frequency, Recent/frequent UTI, Kidney Stones  IntegumentDenies : itching/dry skin, jaundice, rashes, bumps or sores  NeurologicDenies : headaches, dizziness/vertigo, head trauma/injury, recent numbness/weakness, seizures  MusculoskeletalDenies : back pain, decreased range of motion, joint pain/arthritis, Problems Walking/calf or leg pain  EndocrineDenies : bruise easily, excessive thirst, heat/cold intolerance, history of high or low blood sugar  PsychiatricDenies : anxiety, changes in sleep pattern, depression, loss of memory  Heme-LymphDenies : Bleeding Problems, Enlarged Nodes/Swolen Glands, excessive bruising, history of anemia  Allergic-ImmunologicDenies : seasonal allergies    Vitals  Date Time BP Position Site L\R Cuff Size HR RR TEMP (F) WT  HT  BMI kg/m2 BSA m2 O2 Sat HC     07/02/2018 11:45 /66 Sitting    80 - R  98.3 218lbs 2oz 6'  2" 28.01 2.27        Physical Examination  ConstitutionalAppearance : Well nourished, well developed patient in no distress. Ambulating without difficulty.  Ability to Communicate : Normal communication ability  EyesConjunctivae and Eyelids : Conjunctivae and eyelids appear normal  Sclerae : White without injection  HENTHead :  Inspection : Normocephalic and atraumatic  Face :  Inspection : Face within normal limits  Ears :  External Ears : External ears within normal limits  Nose/Nasopharynx :  External Nose : External nose normal appearance, nares patent, no nasal discharge  Mouth and Throat :  General : Oral cavity appearance normal, breath odor normal  Lips : Appearance normal  NeckInspection and Palpation : Normal appearance without tenderness on palpation or deformities, trachea midline  Thyroid : Normal size without tenderness, nodules or masses  Jugular Veins : no JVD  ChestInspection of Chest : No lesions, deformities or traumatic injuries present. No significant scars are present.  Respiratory Effort : Breathing is unlabored without accessory muscle use  Palpation of Chest : Chest wall non-tender with no masses present. Tactile fremitus is normal  Auscultation : Normal breath sounds  CardiovascularHeart :  Auscultation : Heart rate is regular with normal rhythm. No murmurs are heard. No edema.  GastrointestinalAbdominal Exam : Abdomen soft without rigidity or guarding, abdomen non-tender to palpation, normal bowel sounds, no masses present, non-distended  Liver and Spleen : No hepatomegaly present. Liver is non-tender to palpation and spleen is not palpable.  LymphaticNeck : No lymphadenopathy present  Axillae : No lymphadenopathy present  Groin : No lymphadenopathy present  MusculoskeletalGait and Station : Normal Gait, normal station  Neck/Spine/Pelvis : No tenderness of deformities present.  SkinGeneral Inspection : No rashes, lesions or areas of discoloration present. Skin turgor is normal.  General Palpation : No abnormalities, masses or tenderness on palpation.  Neurologic and PsychiatricOrientation : Oriented to person, place and time  Sensation : Normal sensation  Memory : Short and long term memory intact.  Mood and Affect : Normal mood with an appropriate affect      Assessment  Ulcerative colitis     556.6  Ulcerative colitis     556.9/K51.90    Plan  OrdersPatient not identified as an unhealthy alcohol user when screene () - - 07/02/2018  Influenza immunization administered or previously received () - - 07/02/2018  BMI screening above normal () - - 07/02/2018  Current tobacco non-user (CAD, cap, COPD, PV) (dm) (IBD) (1036F, ) - - 07/02/2018  Colorectal cancer screening results documented and reviewed (PV) (3017F) - - 07/02/2018  Documentation of current medications. () - - 07/02/2018  CMP (comprehensive metabolic panel) (66721) - - 07/02/2018  Sed rate (68654) - - 07/02/2018  C-reactive protein (07319) - - 07/02/2018  Ferritin assay (96516) - - 07/02/2018  Iron + iron binding capacity panel ser/plas (69914, 33873) - - 07/02/2018  Vitamin B12 and folate measurement (43857, 17955) - - 07/02/2018  CBC with diff (16638) - - 07/02/2018  25-OH-Vitamin D (76708) - - 07/02/2018  InstructionsDISCUSSION:  I have documented a list of current medications and reviewed it with the patient.  I encouraged the patient to diet and exercise.  DispositionReturn To Clinic in 6 Months    For consideration by Dr. Marx-- would use Tofa and only use Humira if needed.  Discuss with Dr. Devries re: Tubular adenoma in descending colon polyp bx.  Discussed with Salbador.  F/U colon 6-12 months with Dr. Devries.  cc:  Brian Devries MD cc:  Ashok San MD         Electronically Signed by: Vladimir Fabian MD -Author on July 2, 2018 12:21:26 PM ------------

## 2023-04-24 ENCOUNTER — ERX REFILL RESPONSE (OUTPATIENT)
Dept: URBAN - METROPOLITAN AREA CLINIC 98 | Facility: CLINIC | Age: 68
End: 2023-04-24

## 2023-04-24 RX ORDER — DICYCLOMINE HYDROCHLORIDE 20 MG/1
TAKE 1 TABLET BY MOUTH THREE TIMES A DAY TABLET ORAL
Qty: 270 TABLET | Refills: 1 | OUTPATIENT

## 2024-08-19 ENCOUNTER — OFFICE VISIT (OUTPATIENT)
Dept: URBAN - METROPOLITAN AREA CLINIC 98 | Facility: CLINIC | Age: 69
End: 2024-08-19

## 2024-08-19 ENCOUNTER — TELEPHONE ENCOUNTER (OUTPATIENT)
Dept: URBAN - METROPOLITAN AREA CLINIC 98 | Facility: CLINIC | Age: 69
End: 2024-08-19

## 2024-08-19 VITALS — HEIGHT: 74 IN | WEIGHT: 208 LBS | BODY MASS INDEX: 26.69 KG/M2

## 2024-08-19 RX ORDER — PHENAZOPYRIDINE HYDROCHLORIDE 100 MG/1
TAKE 1 CAPSULE (60 MG) BY ORAL ROUTE ONCE DAILY TABLET, COATED ORAL 1
Qty: 0 | Refills: 0 | Status: ACTIVE | COMMUNITY
Start: 1900-01-01

## 2024-08-19 RX ORDER — ALBUTEROL SULFATE 90 UG/1
AEROSOL, METERED RESPIRATORY (INHALATION)
Qty: 0 | Refills: 0 | Status: ACTIVE | COMMUNITY
Start: 1900-01-01

## 2024-08-19 RX ORDER — ONDANSETRON 4 MG/1
TAKE 1 TAB BY MOUTH TWICE A DAY FOR 90 DAYS TABLET, ORALLY DISINTEGRATING ORAL
Qty: 180 | Refills: 0 | Status: ACTIVE | COMMUNITY

## 2024-08-19 RX ORDER — FLUTICASONE PROPIONATE 50 UG/1
SPRAY, METERED NASAL
Qty: 0 | Refills: 0 | Status: ACTIVE | COMMUNITY
Start: 1900-01-01

## 2024-08-19 RX ORDER — INSULIN GLARGINE 100 [IU]/ML
INJECTION, SOLUTION SUBCUTANEOUS
Qty: 0 | Refills: 0 | Status: ACTIVE | COMMUNITY
Start: 1900-01-01

## 2024-08-19 RX ORDER — SACUBITRIL AND VALSARTAN 49; 51 MG/1; MG/1
TABLET, FILM COATED ORAL
Qty: 0 | Refills: 0 | Status: ACTIVE | COMMUNITY
Start: 1900-01-01

## 2024-08-19 RX ORDER — RIVAROXABAN 20 MG/1
TAKE 1 TABLET (20 MG) BY ORAL ROUTE ONCE DAILY WITH THE EVENING MEAL TABLET, FILM COATED ORAL 1
Qty: 0 | Refills: 0 | Status: ACTIVE | COMMUNITY
Start: 1900-01-01

## 2024-08-19 RX ORDER — CARVEDILOL 6.25 MG/1
TABLET, FILM COATED ORAL
Qty: 0 | Refills: 0 | Status: ACTIVE | COMMUNITY
Start: 1900-01-01

## 2024-08-19 RX ORDER — GLIMEPIRIDE 2 MG/1
TAKE 1 TABLET (2 MG) BY ORAL ROUTE ONCE DAILY TABLET ORAL 1
Qty: 0 | Refills: 0 | Status: ACTIVE | COMMUNITY
Start: 1900-01-01

## 2024-08-19 RX ORDER — ALPRAZOLAM 0.5 MG/1
TABLET ORAL
Qty: 0 | Refills: 0 | Status: ACTIVE | COMMUNITY
Start: 1900-01-01

## 2024-08-19 RX ORDER — DICYCLOMINE HYDROCHLORIDE 20 MG/1
TAKE 1 TABLET BY MOUTH THREE TIMES A DAY TABLET ORAL
Qty: 270 TABLET | Refills: 1 | Status: ACTIVE | COMMUNITY

## 2024-08-19 RX ORDER — ZOLPIDEM TARTRATE 10 MG/1
TAKE 1 TABLET (10 MG) BY ORAL ROUTE ONCE DAILY AT BEDTIME TABLET, FILM COATED ORAL 1
Qty: 0 | Refills: 0 | Status: ACTIVE | COMMUNITY
Start: 1900-01-01

## 2024-08-19 RX ORDER — TERAZOSIN 2 MG/1
CAPSULE ORAL
Qty: 0 | Refills: 0 | Status: ACTIVE | COMMUNITY
Start: 1900-01-01

## 2024-08-19 RX ORDER — DICLOFENAC EPOLAMINE 0.01 G/1
APPLY 1 PATCH (180 MG) TO MOST PAINFUL AREA BY TRANSDERMAL ROUTE 2 TIMES PER DAY SYSTEM TOPICAL 2
Qty: 0 | Refills: 0 | Status: ACTIVE | COMMUNITY
Start: 1900-01-01

## 2024-08-19 RX ORDER — HYOSCYAMINE SULFATE 0.12 MG/1
TAKE 1 TABLET ON THE TONGUE AND ALLOW TO DISSOLVE AS NEEDED FOUR TIMES A DAY FOR 30 DAYS TABLET, ORALLY DISINTEGRATING ORAL
Qty: 360 TABLET | Refills: 2 | Status: ACTIVE | COMMUNITY

## 2024-08-19 RX ORDER — DIAZEPAM 5 MG
TABLET ORAL
Qty: 0 | Refills: 0 | Status: ACTIVE | COMMUNITY
Start: 1900-01-01

## 2024-08-19 RX ORDER — ASPIRIN 325 MG/1
TABLET ORAL
Qty: 0 | Refills: 0 | Status: ACTIVE | COMMUNITY
Start: 1900-01-01

## 2024-08-19 RX ORDER — VALSARTAN 320 MG/1
TAKE 1 TABLET (320 MG) BY ORAL ROUTE ONCE DAILY TABLET ORAL 1
Qty: 0 | Refills: 0 | Status: ACTIVE | COMMUNITY
Start: 1900-01-01

## 2024-08-19 RX ORDER — ROSUVASTATIN CALCIUM 10 MG
TAKE 1 TABLET (10 MG) BY ORAL ROUTE ONCE DAILY TABLET ORAL 1
Qty: 0 | Refills: 0 | Status: ACTIVE | COMMUNITY
Start: 1900-01-01

## 2024-08-28 ENCOUNTER — OFFICE VISIT (OUTPATIENT)
Dept: URBAN - METROPOLITAN AREA CLINIC 98 | Facility: CLINIC | Age: 69
End: 2024-08-28

## 2024-08-28 VITALS — HEIGHT: 74 IN | BODY MASS INDEX: 26.69 KG/M2 | WEIGHT: 208 LBS

## 2024-08-28 RX ORDER — ROSUVASTATIN CALCIUM 10 MG
TAKE 1 TABLET (10 MG) BY ORAL ROUTE ONCE DAILY TABLET ORAL 1
Qty: 0 | Refills: 0 | Status: ACTIVE | COMMUNITY
Start: 1900-01-01

## 2024-08-28 RX ORDER — DICLOFENAC EPOLAMINE 0.01 G/1
APPLY 1 PATCH (180 MG) TO MOST PAINFUL AREA BY TRANSDERMAL ROUTE 2 TIMES PER DAY SYSTEM TOPICAL 2
Qty: 0 | Refills: 0 | Status: ACTIVE | COMMUNITY
Start: 1900-01-01

## 2024-08-28 RX ORDER — DIAZEPAM 5 MG
TABLET ORAL
Qty: 0 | Refills: 0 | Status: ACTIVE | COMMUNITY
Start: 1900-01-01

## 2024-08-28 RX ORDER — INSULIN GLARGINE 100 [IU]/ML
INJECTION, SOLUTION SUBCUTANEOUS
Qty: 0 | Refills: 0 | Status: ACTIVE | COMMUNITY
Start: 1900-01-01

## 2024-08-28 RX ORDER — CARVEDILOL 6.25 MG/1
TABLET, FILM COATED ORAL
Qty: 0 | Refills: 0 | Status: ACTIVE | COMMUNITY
Start: 1900-01-01

## 2024-08-28 RX ORDER — FLUTICASONE PROPIONATE 50 UG/1
SPRAY, METERED NASAL
Qty: 0 | Refills: 0 | Status: ACTIVE | COMMUNITY
Start: 1900-01-01

## 2024-08-28 RX ORDER — ALBUTEROL SULFATE 90 UG/1
AEROSOL, METERED RESPIRATORY (INHALATION)
Qty: 0 | Refills: 0 | Status: ACTIVE | COMMUNITY
Start: 1900-01-01

## 2024-08-28 RX ORDER — RIVAROXABAN 20 MG/1
TAKE 1 TABLET (20 MG) BY ORAL ROUTE ONCE DAILY WITH THE EVENING MEAL TABLET, FILM COATED ORAL 1
Qty: 0 | Refills: 0 | Status: ACTIVE | COMMUNITY
Start: 1900-01-01

## 2024-08-28 RX ORDER — DICYCLOMINE HYDROCHLORIDE 20 MG/1
TAKE 1 TABLET BY MOUTH THREE TIMES A DAY TABLET ORAL
Qty: 270 TABLET | Refills: 1 | Status: ACTIVE | COMMUNITY

## 2024-08-28 RX ORDER — TERAZOSIN 2 MG/1
CAPSULE ORAL
Qty: 0 | Refills: 0 | Status: ACTIVE | COMMUNITY
Start: 1900-01-01

## 2024-08-28 RX ORDER — SACUBITRIL AND VALSARTAN 49; 51 MG/1; MG/1
TABLET, FILM COATED ORAL
Qty: 0 | Refills: 0 | Status: ACTIVE | COMMUNITY
Start: 1900-01-01

## 2024-08-28 RX ORDER — ALPRAZOLAM 0.5 MG/1
TABLET ORAL
Qty: 0 | Refills: 0 | Status: ACTIVE | COMMUNITY
Start: 1900-01-01

## 2024-08-28 RX ORDER — ZOLPIDEM TARTRATE 10 MG/1
TAKE 1 TABLET (10 MG) BY ORAL ROUTE ONCE DAILY AT BEDTIME TABLET, FILM COATED ORAL 1
Qty: 0 | Refills: 0 | Status: ACTIVE | COMMUNITY
Start: 1900-01-01

## 2024-08-28 RX ORDER — VALSARTAN 320 MG/1
TAKE 1 TABLET (320 MG) BY ORAL ROUTE ONCE DAILY TABLET ORAL 1
Qty: 0 | Refills: 0 | Status: ACTIVE | COMMUNITY
Start: 1900-01-01

## 2024-08-28 RX ORDER — HYOSCYAMINE SULFATE 0.12 MG/1
TAKE 1 TABLET ON THE TONGUE AND ALLOW TO DISSOLVE AS NEEDED FOUR TIMES A DAY FOR 30 DAYS TABLET, ORALLY DISINTEGRATING ORAL
Qty: 360 TABLET | Refills: 2 | Status: ACTIVE | COMMUNITY

## 2024-08-28 RX ORDER — PHENAZOPYRIDINE HYDROCHLORIDE 100 MG/1
TAKE 1 CAPSULE (60 MG) BY ORAL ROUTE ONCE DAILY TABLET, COATED ORAL 1
Qty: 0 | Refills: 0 | Status: ACTIVE | COMMUNITY
Start: 1900-01-01

## 2024-08-28 RX ORDER — GLIMEPIRIDE 2 MG/1
TAKE 1 TABLET (2 MG) BY ORAL ROUTE ONCE DAILY TABLET ORAL 1
Qty: 0 | Refills: 0 | Status: ACTIVE | COMMUNITY
Start: 1900-01-01

## 2024-08-28 RX ORDER — ASPIRIN 325 MG/1
TABLET ORAL
Qty: 0 | Refills: 0 | Status: ACTIVE | COMMUNITY
Start: 1900-01-01

## 2024-08-28 RX ORDER — ONDANSETRON 4 MG/1
TAKE 1 TAB BY MOUTH TWICE A DAY FOR 90 DAYS TABLET, ORALLY DISINTEGRATING ORAL
Qty: 180 | Refills: 0 | Status: ACTIVE | COMMUNITY

## 2024-09-25 ENCOUNTER — OFFICE VISIT (OUTPATIENT)
Dept: URBAN - METROPOLITAN AREA CLINIC 98 | Facility: CLINIC | Age: 69
End: 2024-09-25

## 2024-09-25 VITALS — HEIGHT: 74 IN | BODY MASS INDEX: 26.69 KG/M2 | WEIGHT: 208 LBS

## 2024-09-25 RX ORDER — GLIMEPIRIDE 2 MG/1
TAKE 1 TABLET (2 MG) BY ORAL ROUTE ONCE DAILY TABLET ORAL 1
Qty: 0 | Refills: 0 | Status: ACTIVE | COMMUNITY
Start: 1900-01-01

## 2024-09-25 RX ORDER — ROSUVASTATIN CALCIUM 10 MG
TAKE 1 TABLET (10 MG) BY ORAL ROUTE ONCE DAILY TABLET ORAL 1
Qty: 0 | Refills: 0 | Status: ACTIVE | COMMUNITY
Start: 1900-01-01

## 2024-09-25 RX ORDER — INSULIN GLARGINE 100 [IU]/ML
INJECTION, SOLUTION SUBCUTANEOUS
Qty: 0 | Refills: 0 | Status: ACTIVE | COMMUNITY
Start: 1900-01-01

## 2024-09-25 RX ORDER — DICYCLOMINE HYDROCHLORIDE 20 MG/1
TAKE 1 TABLET BY MOUTH THREE TIMES A DAY TABLET ORAL
Qty: 270 TABLET | Refills: 1 | Status: ACTIVE | COMMUNITY

## 2024-09-25 RX ORDER — FLUTICASONE PROPIONATE 50 UG/1
SPRAY, METERED NASAL
Qty: 0 | Refills: 0 | Status: ACTIVE | COMMUNITY
Start: 1900-01-01

## 2024-09-25 RX ORDER — SACUBITRIL AND VALSARTAN 49; 51 MG/1; MG/1
TABLET, FILM COATED ORAL
Qty: 0 | Refills: 0 | Status: ACTIVE | COMMUNITY
Start: 1900-01-01

## 2024-09-25 RX ORDER — PHENAZOPYRIDINE HYDROCHLORIDE 100 MG/1
TAKE 1 CAPSULE (60 MG) BY ORAL ROUTE ONCE DAILY TABLET, COATED ORAL 1
Qty: 0 | Refills: 0 | Status: ACTIVE | COMMUNITY
Start: 1900-01-01

## 2024-09-25 RX ORDER — ZOLPIDEM TARTRATE 10 MG/1
TAKE 1 TABLET (10 MG) BY ORAL ROUTE ONCE DAILY AT BEDTIME TABLET, FILM COATED ORAL 1
Qty: 0 | Refills: 0 | Status: ACTIVE | COMMUNITY
Start: 1900-01-01

## 2024-09-25 RX ORDER — ALBUTEROL SULFATE 90 UG/1
AEROSOL, METERED RESPIRATORY (INHALATION)
Qty: 0 | Refills: 0 | Status: ACTIVE | COMMUNITY
Start: 1900-01-01

## 2024-09-25 RX ORDER — HYOSCYAMINE SULFATE 0.12 MG/1
TAKE 1 TABLET ON THE TONGUE AND ALLOW TO DISSOLVE AS NEEDED FOUR TIMES A DAY FOR 30 DAYS TABLET, ORALLY DISINTEGRATING ORAL
Qty: 360 TABLET | Refills: 2 | Status: ACTIVE | COMMUNITY

## 2024-09-25 RX ORDER — ONDANSETRON 4 MG/1
TAKE 1 TAB BY MOUTH TWICE A DAY FOR 90 DAYS TABLET, ORALLY DISINTEGRATING ORAL
Qty: 180 | Refills: 0 | Status: ACTIVE | COMMUNITY

## 2024-09-25 RX ORDER — ASPIRIN 325 MG/1
TABLET ORAL
Qty: 0 | Refills: 0 | Status: ACTIVE | COMMUNITY
Start: 1900-01-01

## 2024-09-25 RX ORDER — ALPRAZOLAM 0.5 MG/1
TABLET ORAL
Qty: 0 | Refills: 0 | Status: ACTIVE | COMMUNITY
Start: 1900-01-01

## 2024-09-25 RX ORDER — DIAZEPAM 5 MG
TABLET ORAL
Qty: 0 | Refills: 0 | Status: ACTIVE | COMMUNITY
Start: 1900-01-01

## 2024-09-25 RX ORDER — DICLOFENAC EPOLAMINE 0.01 G/1
APPLY 1 PATCH (180 MG) TO MOST PAINFUL AREA BY TRANSDERMAL ROUTE 2 TIMES PER DAY SYSTEM TOPICAL 2
Qty: 0 | Refills: 0 | Status: ACTIVE | COMMUNITY
Start: 1900-01-01

## 2024-09-25 RX ORDER — CARVEDILOL 6.25 MG/1
TABLET, FILM COATED ORAL
Qty: 0 | Refills: 0 | Status: ACTIVE | COMMUNITY
Start: 1900-01-01

## 2024-09-25 RX ORDER — TERAZOSIN 2 MG/1
CAPSULE ORAL
Qty: 0 | Refills: 0 | Status: ACTIVE | COMMUNITY
Start: 1900-01-01

## 2024-09-25 RX ORDER — VALSARTAN 320 MG/1
TAKE 1 TABLET (320 MG) BY ORAL ROUTE ONCE DAILY TABLET ORAL 1
Qty: 0 | Refills: 0 | Status: ACTIVE | COMMUNITY
Start: 1900-01-01

## 2024-09-25 RX ORDER — RIVAROXABAN 20 MG/1
TAKE 1 TABLET (20 MG) BY ORAL ROUTE ONCE DAILY WITH THE EVENING MEAL TABLET, FILM COATED ORAL 1
Qty: 0 | Refills: 0 | Status: ACTIVE | COMMUNITY
Start: 1900-01-01